# Patient Record
Sex: MALE | Race: WHITE
[De-identification: names, ages, dates, MRNs, and addresses within clinical notes are randomized per-mention and may not be internally consistent; named-entity substitution may affect disease eponyms.]

---

## 2019-06-21 ENCOUNTER — HOSPITAL ENCOUNTER (EMERGENCY)
Dept: HOSPITAL 25 - UCCORT | Age: 34
Discharge: HOME | End: 2019-06-21
Payer: COMMERCIAL

## 2019-06-21 VITALS — SYSTOLIC BLOOD PRESSURE: 143 MMHG | DIASTOLIC BLOOD PRESSURE: 88 MMHG

## 2019-06-21 DIAGNOSIS — F17.220: ICD-10-CM

## 2019-06-21 DIAGNOSIS — T63.481A: ICD-10-CM
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
## 2019-06-21 PROCEDURE — G0463 HOSPITAL OUTPT CLINIC VISIT: HCPCS

## 2019-06-21 PROCEDURE — 99201: CPT

## 2019-06-21 NOTE — UC
Skin Complaint HPI





- HPI Summary


HPI Summary: 





33 y/o male with skin concern. Woke up this morning with tick on the nipple. 

removed most of it but maybe head still in skin. Was not there last night. Been 

present for < 12 hours. Nothing worsens or improves symptoms 





- History of Current Complaint


Chief Complaint: UCGeneralIllness


Time Seen by Provider: 06/21/19 08:48


Stated Complaint: TICK CONCERN


Hx Obtained From: Patient


Pain Intensity: 0





- Allergy/Home Medications


Allergies/Adverse Reactions: 


 Allergies











Allergy/AdvReac Type Severity Reaction Status Date / Time


 


No Known Allergies Allergy   Verified 06/21/19 08:30











Home Medications: 


 Home Medications





NK [No Home Medications Reported]  06/21/19 [History Confirmed 06/21/19]











PMH/Surg Hx/FS Hx/Imm Hx


Previously Healthy: Yes





- Surgical History


Surgical History: Yes


Surgery Procedure, Year, and Place: REPAIR OF UNDECENDED TESTICLE AGE 7





- Family History


Known Family History: Positive: Diabetes





- Social History


Occupation: Employed Full-time


Alcohol Use: Occasionally


Substance Use Type: None


Smoking Status (MU): Never Smoked Tobacco


Type: Smokeless Tobacco


Amount Used/How Often: 1 can every 3 days





Review of Systems


All Other Systems Reviewed And Are Negative: Yes


Skin: Positive: Other - tick bite





Physical Exam


Triage Information Reviewed: Yes


Appearance: Well-Appearing, No Pain Distress


Vital Signs: 


 Initial Vital Signs











Temp  97.9 F   06/21/19 08:30


 


Pulse  70   06/21/19 08:30


 


Resp  16   06/21/19 08:30


 


BP  143/88   06/21/19 08:30


 


Pulse Ox  98   06/21/19 08:30











Vital Signs Reviewed: Yes


Eye Exam: Normal


ENT Exam: Normal


Dental Exam: Normal


Neck exam: Normal


Cardiovascular Exam: Normal


Musculoskeletal Exam: Normal


Neurological Exam: Normal


Psychological Exam: Normal


Skin: Positive: Other - small pinpoint black speck under the right breast. 

taken alcohol swab to disinfect, used splinter forceps to remove the rest of 

the tick and placed triple antibiotic and band aid on the the area





Course/Dx





- Course


Course Of Treatment: 





tick bite. No EM. On for < 12 hours. no Abx required. watch for cellulitis and 

EM. pt agreeable 





- Diagnoses


Provider Diagnosis: 


 Tick bite of right side of chest wall








Discharge





- Sign-Out/Discharge


Documenting (check all that apply): Patient Departure


All imaging exams completed and their final reports reviewed: No Studies





- Discharge Plan


Condition: Good


Disposition: HOME


Patient Education Materials:  Tick Bite (ED)


Referrals: 


No Primary Care Phys,NOPCP [Primary Care Provider] - 





- Billing Disposition and Condition


Condition: GOOD


Disposition: Home